# Patient Record
Sex: FEMALE | Race: BLACK OR AFRICAN AMERICAN | NOT HISPANIC OR LATINO | Employment: FULL TIME | URBAN - METROPOLITAN AREA
[De-identification: names, ages, dates, MRNs, and addresses within clinical notes are randomized per-mention and may not be internally consistent; named-entity substitution may affect disease eponyms.]

---

## 2020-07-10 ENCOUNTER — OFFICE VISIT (OUTPATIENT)
Dept: OBSTETRICS AND GYNECOLOGY | Facility: CLINIC | Age: 60
End: 2020-07-10
Payer: COMMERCIAL

## 2020-07-10 VITALS
SYSTOLIC BLOOD PRESSURE: 128 MMHG | WEIGHT: 196.19 LBS | BODY MASS INDEX: 32.69 KG/M2 | HEIGHT: 65 IN | DIASTOLIC BLOOD PRESSURE: 84 MMHG

## 2020-07-10 DIAGNOSIS — Z12.4 SCREENING FOR CERVICAL CANCER: ICD-10-CM

## 2020-07-10 DIAGNOSIS — Z12.31 SCREENING MAMMOGRAM, ENCOUNTER FOR: ICD-10-CM

## 2020-07-10 DIAGNOSIS — Z01.419 ENCOUNTER FOR GYNECOLOGICAL EXAMINATION (GENERAL) (ROUTINE) WITHOUT ABNORMAL FINDINGS: Primary | ICD-10-CM

## 2020-07-10 DIAGNOSIS — N95.2 ATROPHIC VAGINITIS: ICD-10-CM

## 2020-07-10 PROCEDURE — 87624 HPV HI-RISK TYP POOLED RSLT: CPT

## 2020-07-10 PROCEDURE — 99999 PR PBB SHADOW E&M-NEW PATIENT-LVL III: ICD-10-PCS | Mod: PBBFAC,,, | Performed by: OBSTETRICS & GYNECOLOGY

## 2020-07-10 PROCEDURE — 99386 PREV VISIT NEW AGE 40-64: CPT | Mod: S$GLB,,, | Performed by: OBSTETRICS & GYNECOLOGY

## 2020-07-10 PROCEDURE — 99386 PR PREVENTIVE VISIT,NEW,40-64: ICD-10-PCS | Mod: S$GLB,,, | Performed by: OBSTETRICS & GYNECOLOGY

## 2020-07-10 PROCEDURE — 88175 CYTOPATH C/V AUTO FLUID REDO: CPT

## 2020-07-10 PROCEDURE — 99999 PR PBB SHADOW E&M-NEW PATIENT-LVL III: CPT | Mod: PBBFAC,,, | Performed by: OBSTETRICS & GYNECOLOGY

## 2020-07-10 RX ORDER — PAROXETINE HYDROCHLORIDE 20 MG/1
TABLET, FILM COATED ORAL
COMMUNITY
Start: 2020-04-24

## 2020-07-10 RX ORDER — ATENOLOL 50 MG/1
TABLET ORAL
COMMUNITY
Start: 2020-05-21

## 2020-07-10 RX ORDER — LOSARTAN POTASSIUM 25 MG/1
TABLET ORAL
COMMUNITY
Start: 2020-05-21

## 2020-07-17 LAB
HPV HR 12 DNA SPEC QL NAA+PROBE: POSITIVE
HPV16 AG SPEC QL: NEGATIVE
HPV18 DNA SPEC QL NAA+PROBE: NEGATIVE

## 2020-07-20 LAB
FINAL PATHOLOGIC DIAGNOSIS: NORMAL
Lab: NORMAL

## 2020-08-04 ENCOUNTER — HOSPITAL ENCOUNTER (OUTPATIENT)
Dept: RADIOLOGY | Facility: HOSPITAL | Age: 60
Discharge: HOME | End: 2020-08-04
Attending: OBSTETRICS & GYNECOLOGY
Payer: COMMERCIAL

## 2020-08-04 VITALS — BODY MASS INDEX: 32.65 KG/M2 | HEIGHT: 65 IN | WEIGHT: 196 LBS

## 2020-08-04 DIAGNOSIS — Z12.31 SCREENING MAMMOGRAM, ENCOUNTER FOR: ICD-10-CM

## 2020-08-04 PROCEDURE — 77067 SCR MAMMO BI INCL CAD: CPT | Mod: TC

## 2020-08-04 PROCEDURE — 77067 SCR MAMMO BI INCL CAD: CPT | Mod: 26,,, | Performed by: RADIOLOGY

## 2020-08-04 PROCEDURE — 77063 BREAST TOMOSYNTHESIS BI: CPT | Mod: 26,,, | Performed by: RADIOLOGY

## 2020-08-04 PROCEDURE — 77067 MAMMO DIGITAL SCREENING BILAT WITH TOMOSYNTHESIS_CAD: ICD-10-PCS | Mod: 26,,, | Performed by: RADIOLOGY

## 2020-08-04 PROCEDURE — 77063 MAMMO DIGITAL SCREENING BILAT WITH TOMOSYNTHESIS_CAD: ICD-10-PCS | Mod: 26,,, | Performed by: RADIOLOGY

## 2023-07-20 ENCOUNTER — LAB VISIT (OUTPATIENT)
Dept: LAB | Facility: HOSPITAL | Age: 63
End: 2023-07-20
Attending: NURSE PRACTITIONER
Payer: COMMERCIAL

## 2023-07-20 ENCOUNTER — OFFICE VISIT (OUTPATIENT)
Dept: OBSTETRICS AND GYNECOLOGY | Facility: CLINIC | Age: 63
End: 2023-07-20
Payer: COMMERCIAL

## 2023-07-20 DIAGNOSIS — Z01.419 ENCOUNTER FOR GYNECOLOGICAL EXAMINATION WITHOUT ABNORMAL FINDING: Primary | ICD-10-CM

## 2023-07-20 DIAGNOSIS — L30.9 PERIANAL DERMATITIS: ICD-10-CM

## 2023-07-20 DIAGNOSIS — B97.7 HPV IN FEMALE: ICD-10-CM

## 2023-07-20 DIAGNOSIS — K62.9 PERIANAL LESION: ICD-10-CM

## 2023-07-20 PROCEDURE — 99386 PREV VISIT NEW AGE 40-64: CPT | Mod: S$GLB,,, | Performed by: NURSE PRACTITIONER

## 2023-07-20 PROCEDURE — 99386 PR PREVENTIVE VISIT,NEW,40-64: ICD-10-PCS | Mod: S$GLB,,, | Performed by: NURSE PRACTITIONER

## 2023-07-20 PROCEDURE — 1160F RVW MEDS BY RX/DR IN RCRD: CPT | Mod: CPTII,S$GLB,, | Performed by: NURSE PRACTITIONER

## 2023-07-20 PROCEDURE — 86695 HERPES SIMPLEX TYPE 1 TEST: CPT | Performed by: NURSE PRACTITIONER

## 2023-07-20 PROCEDURE — 87529 HSV DNA AMP PROBE: CPT | Performed by: NURSE PRACTITIONER

## 2023-07-20 PROCEDURE — 4010F ACE/ARB THERAPY RXD/TAKEN: CPT | Mod: CPTII,S$GLB,, | Performed by: NURSE PRACTITIONER

## 2023-07-20 PROCEDURE — 1159F MED LIST DOCD IN RCRD: CPT | Mod: CPTII,S$GLB,, | Performed by: NURSE PRACTITIONER

## 2023-07-20 PROCEDURE — 99999 PR PBB SHADOW E&M-EST. PATIENT-LVL II: CPT | Mod: PBBFAC,,, | Performed by: NURSE PRACTITIONER

## 2023-07-20 PROCEDURE — 86694 HERPES SIMPLEX NES ANTBDY: CPT | Performed by: NURSE PRACTITIONER

## 2023-07-20 PROCEDURE — 4010F PR ACE/ARB THEARPY RXD/TAKEN: ICD-10-PCS | Mod: CPTII,S$GLB,, | Performed by: NURSE PRACTITIONER

## 2023-07-20 PROCEDURE — 81514 NFCT DS BV&VAGINITIS DNA ALG: CPT | Performed by: NURSE PRACTITIONER

## 2023-07-20 PROCEDURE — 88175 CYTOPATH C/V AUTO FLUID REDO: CPT | Performed by: NURSE PRACTITIONER

## 2023-07-20 PROCEDURE — 1159F PR MEDICATION LIST DOCUMENTED IN MEDICAL RECORD: ICD-10-PCS | Mod: CPTII,S$GLB,, | Performed by: NURSE PRACTITIONER

## 2023-07-20 PROCEDURE — 87624 HPV HI-RISK TYP POOLED RSLT: CPT | Performed by: NURSE PRACTITIONER

## 2023-07-20 PROCEDURE — 1160F PR REVIEW ALL MEDS BY PRESCRIBER/CLIN PHARMACIST DOCUMENTED: ICD-10-PCS | Mod: CPTII,S$GLB,, | Performed by: NURSE PRACTITIONER

## 2023-07-20 PROCEDURE — 99999 PR PBB SHADOW E&M-EST. PATIENT-LVL II: ICD-10-PCS | Mod: PBBFAC,,, | Performed by: NURSE PRACTITIONER

## 2023-07-20 PROCEDURE — 36415 COLL VENOUS BLD VENIPUNCTURE: CPT | Mod: PO | Performed by: NURSE PRACTITIONER

## 2023-07-20 PROCEDURE — 86592 SYPHILIS TEST NON-TREP QUAL: CPT | Performed by: NURSE PRACTITIONER

## 2023-07-20 RX ORDER — METFORMIN HYDROCHLORIDE 500 MG/5ML
SOLUTION ORAL
COMMUNITY
End: 2023-08-07 | Stop reason: CLARIF

## 2023-07-20 NOTE — PROGRESS NOTES
Subjective:       Patient ID: Opal May is a 62 y.o. female.    Chief Complaint:  Annual Exam      History of Present Illness  HPI  Annual Exam-Postmenopausal   presents for annual exam. The patient has complaints today of itching to upper buttocks; started as a bump; is now open.  Was out of town right before.  Baths with dial.  Additives to water.    The patient is sexually active with fiance; dyspareunia and dryness sometimes.  Vagasil lubricant with relief. GYN screening history: last pap: approximate date 7/10/2020 and was abnormal: NILM, + other types of HPV and last mammogram: approximate date  at [facility] and was normal. Pt declined colpo after last pap.  The patient has never taken hormone replacement therapy. Patient denies post-menopausal vaginal bleeding. The patient wears seatbelts: yes. The patient participates in regular exercise: yes. Has the patient ever been transfused or tattooed?: yes. Tattoo.  The patient reports that there is not domestic violence in her life.    Colonoscopy ; rpt 7 years.  No bladder or bowel issues; mild night sweats -- tolerable.  Sometimes not able to go back to sleep    GYN & OB History  Patient's last menstrual period was 2012 (approximate).   Date of Last Pap: 2023    OB History    Para Term  AB Living   2 2 2     2   SAB IAB Ectopic Multiple Live Births           2      # Outcome Date GA Lbr Karlos/2nd Weight Sex Delivery Anes PTL Lv   2 Term 87           1 Term 86               Review of Systems  Review of Systems   Constitutional:  Negative for activity change, appetite change, chills, fatigue and fever.        +night sweats   HENT:  Negative for nasal congestion and mouth sores.    Respiratory:  Negative for cough, shortness of breath and wheezing.    Cardiovascular:  Negative for chest pain.   Gastrointestinal:  Negative for abdominal pain, constipation, diarrhea, nausea and vomiting.    Endocrine: Negative for hair loss and hot flashes.   Genitourinary:  Positive for dyspareunia, genital sores and vaginal dryness. Negative for bladder incontinence, decreased libido, dysuria, frequency, hot flashes, pelvic pain, urgency, vaginal discharge, vaginal pain, urinary incontinence, postcoital bleeding, postmenopausal bleeding and vaginal odor.        +perianal itching   Musculoskeletal:  Negative for back pain.   Integumentary:  Negative for breast mass, nipple discharge, breast skin changes and breast tenderness.   Neurological:  Negative for headaches.   Hematological:  Negative for adenopathy.   Psychiatric/Behavioral:  Negative for depression. The patient is not nervous/anxious.    All other systems reviewed and are negative.  Breast: Negative for breast self exam, lump, mass, mastodynia, nipple discharge, skin changes and tenderness        Objective:      Physical Exam:   Constitutional: She is oriented to person, place, and time. She appears well-developed and well-nourished. No distress.    HENT:   Head: Normocephalic and atraumatic.   Nose: Nose normal.    Eyes: Pupils are equal, round, and reactive to light. Conjunctivae and EOM are normal. Right eye exhibits no discharge. Left eye exhibits no discharge.     Cardiovascular:  Normal rate, regular rhythm and normal heart sounds.      Exam reveals no gallop, no friction rub, no clubbing, no cyanosis and no edema.       No murmur heard.   Pulmonary/Chest: Effort normal and breath sounds normal. No respiratory distress. She has no decreased breath sounds. She has no wheezes. She has no rhonchi. She has no rales. She exhibits no tenderness. Right breast exhibits augmentation. Right breast exhibits no inverted nipple, no mass, no nipple discharge, no skin change, no tenderness, no bleeding and no swelling. Left breast exhibits augmentation. Left breast exhibits no inverted nipple, no mass, no nipple discharge, no skin change, no tenderness, no bleeding  and no swelling. Breasts are symmetrical.        Abdominal: Soft. Bowel sounds are normal. She exhibits no distension. There is no abdominal tenderness. There is no rebound and no guarding. Hernia confirmed negative in the right inguinal area and confirmed negative in the left inguinal area.     Genitourinary:    Inguinal canal, uterus, right adnexa and left adnexa normal.   Rectum:      External hemorrhoid present.            Pelvic exam was performed with patient in the lithotomy position.   The external female genitalia was normal.   No external genitalia lesions identified,Genitalia hair distrobution normal .   Labial bartholins normal.There is no rash, tenderness, lesion or injury on the right labia. There is no rash, tenderness, lesion or injury on the left labia. Cervix is normal. Right adnexum displays no mass, no tenderness and no fullness. Left adnexum displays no mass, no tenderness and no fullness. There is vaginal discharge (white, creamy; no odor) in the vagina. No erythema, tenderness or bleeding in the vagina.    No foreign body in the vagina.      No signs of injury in the vagina.   Vagina was moist.Vaginal atrophy noted. Cervix exhibits no motion tenderness, no lesion, no discharge, no friability, no lesion, no tenderness and no polyp.    pap smear completedUerus contour normal  Uterus is not enlarged and not tender. Uterus size: 12 cm.Normal urethral meatus.Urethral Meatus exhibits: urethral lesion and prolapsedUrethra findings: no urethral mass, no tenderness and no urethral scarringBladder findings: no bladder distention and no bladder tenderness          Musculoskeletal: Normal range of motion and moves all extremeties.      Lymphadenopathy: No inguinal adenopathy noted on the right or left side.    Neurological: She is alert and oriented to person, place, and time.    Skin: Skin is warm and dry. No rash noted. She is not diaphoretic. No cyanosis or erythema. No pallor. Nails show no clubbing.     Psychiatric: She has a normal mood and affect. Her speech is normal and behavior is normal. Judgment and thought content normal.           Assessment:        1. Encounter for gynecological examination without abnormal finding    2. Perianal dermatitis    3. HPV in female    4. Perianal lesion               Plan:   Labs and cx today    The pt was extensively counseled on Genital HSV.  In particular, it is an incurable and recurrent disease that is disruptive but benign.  Symptoms are treatable with medications, but theses medications do not cure the disease. Viral transmission may occur during asymptomatic and symptomatic phases, although transmission during symptomatic phases is more likely.  Thus, avoidance of intercourse during symptomatic phases is recommended. The use of a condoms or other barrier protectors may not prevent transmission to other partners. It is recommended that pt notify any future partners of HSV status prior to initiating any sexual intercourse activities.  The patient indicates understanding of these issues.     Discussed oil based lubricants for dryness; RTC if not effective.    Reviewed options for hot flushes, including lifestyle measures such as avoiding caffeine, dressing in layers, avoiding hot foods/drinks, soy milk before bed, sleeping with a fan--reviewed use otc--amberen, estroven, rocio anita, black cohosh, red clover, non estrogen-- pt already taking paxil    Reviewed updated recommendations for pap smears (every 3 years) in low risk patients.  Recommend annual pelvic exams.  Reviewed recommendations for annual CBE.  Pap collected today.  Next pap due in 2026.   RTC 1 year or sooner prn.  To PCP for other health maintenance.      Encounter for gynecological examination without abnormal finding  -     Liquid-Based Pap Smear, Screening  -     HPV High Risk Genotypes, PCR    Perianal dermatitis  -     Vaginosis Screen by DNA Probe    HPV in female  -     Liquid-Based Pap Smear,  Screening  -     HPV High Risk Genotypes, PCR    Perianal lesion  -     HSV by Rapid PCR, Non-Blood [facility]; Skin (perianal)  -     HSV 1 & 2, IgG; Future; Expected date: 07/20/2023  -     HSV 1 & 2, IgM; Future; Expected date: 07/20/2023  -     RPR; Future; Expected date: 07/20/2023

## 2023-07-21 DIAGNOSIS — B37.31 YEAST VAGINITIS: Primary | ICD-10-CM

## 2023-07-21 DIAGNOSIS — A60.1 HERPES SIMPLEX INFECTION OF PERIANAL SKIN: Primary | ICD-10-CM

## 2023-07-21 LAB
BACTERIAL VAGINOSIS DNA: NEGATIVE
CANDIDA GLABRATA DNA: NEGATIVE
CANDIDA KRUSEI DNA: NEGATIVE
CANDIDA RRNA VAG QL PROBE: POSITIVE
HSV1 IGG SERPL QL IA: POSITIVE
HSV2 IGG SERPL QL IA: POSITIVE
RPR SER QL: NORMAL
T VAGINALIS RRNA GENITAL QL PROBE: NEGATIVE

## 2023-07-21 RX ORDER — FLUCONAZOLE 150 MG/1
150 TABLET ORAL
Qty: 2 TABLET | Refills: 0 | Status: SHIPPED | OUTPATIENT
Start: 2023-07-21 | End: 2023-07-25

## 2023-07-21 RX ORDER — VALACYCLOVIR HYDROCHLORIDE 1 G/1
1000 TABLET, FILM COATED ORAL 2 TIMES DAILY
Qty: 20 TABLET | Refills: 0 | Status: SHIPPED | OUTPATIENT
Start: 2023-07-21 | End: 2023-08-07 | Stop reason: DRUGHIGH

## 2023-07-21 NOTE — PROGRESS NOTES
Attempted to contact pt no answer lvm for pt to give clinic a callback Monday morning at [phone].

## 2023-07-21 NOTE — PROGRESS NOTES
Attempted to contact pt no answer lvm for pt to give clinic a callback Monday morning at [phone]

## 2023-07-24 LAB — HSV AB, IGM BY EIA: 0.85 INDEX

## 2023-07-24 NOTE — PROGRESS NOTES
Called pt confirmed pt identifiers pt stated she viewed results and picked up medication. Asked pt if she understood results pt stated yes asked if she had any questions pt stated she did not. Verbalized understanding

## 2023-07-24 NOTE — PROGRESS NOTES
Called pt confirmed pt identifiers pt stated she viewed results and picked up medication. Asked pt if she understood results pt stated yes asked if she had any questions pt stated she did not. Verbalized understanding.

## 2023-07-25 LAB
HSV1 DNA SPEC QL NAA+PROBE: NEGATIVE
HSV2 DNA SPEC QL NAA+PROBE: POSITIVE
SPECIMEN SOURCE: ABNORMAL

## 2023-07-26 PROBLEM — R87.619 ATYPICAL GLANDULAR CELLS OF UNDETERMINED SIGNIFICANCE (AGUS) ON CERVICAL PAP SMEAR: Status: ACTIVE | Noted: 2023-07-26

## 2023-07-26 LAB
CLINICAL INFO: ABNORMAL
CYTO CVX: ABNORMAL
CYTOLOGIST CVX/VAG CYTO: ABNORMAL
CYTOLOGIST CVX/VAG CYTO: ABNORMAL
CYTOLOGY CMNT CVX/VAG CYTO-IMP: ABNORMAL
CYTOLOGY PAP THIN PREP EXPLANATION: ABNORMAL
DATE OF PREVIOUS PAP: ABNORMAL
DATE PREVIOUS BX: NO
GEN CATEG CVX/VAG CYTO-IMP: ABNORMAL
HPV I/H RISK 4 DNA CVX QL NAA+PROBE: NOT DETECTED
LMP START DATE: ABNORMAL
MICROORGANISM CVX/VAG CYTO: ABNORMAL
PATHOLOGIST CVX/VAG CYTO: ABNORMAL
SERVICE CMNT-IMP: ABNORMAL
SPECIMEN SOURCE CVX/VAG CYTO: ABNORMAL
STAT OF ADQ CVX/VAG CYTO-IMP: ABNORMAL

## 2023-07-26 NOTE — PROGRESS NOTES
Called pt confirmed pt identifiers informed pt of pap smear results and that she will need colposcopy and endometrial biopsy with  and VV with LW to discuss both procedures, pt verbalized understanding scheduled pt for VV with LW 8/7 and procedures with  for 8/25, spoke to pt 7/24 regarding HSV and RX, pt verbalized understanding and had no additional questions

## 2023-08-07 ENCOUNTER — OFFICE VISIT (OUTPATIENT)
Dept: OBSTETRICS AND GYNECOLOGY | Facility: CLINIC | Age: 63
End: 2023-08-07
Payer: COMMERCIAL

## 2023-08-07 ENCOUNTER — TELEPHONE (OUTPATIENT)
Dept: OBSTETRICS AND GYNECOLOGY | Facility: CLINIC | Age: 63
End: 2023-08-07
Payer: COMMERCIAL

## 2023-08-07 DIAGNOSIS — R87.619 ATYPICAL GLANDULAR CELLS OF UNDETERMINED SIGNIFICANCE (AGUS) ON CERVICAL PAP SMEAR: Primary | ICD-10-CM

## 2023-08-07 DIAGNOSIS — A60.1 HERPES SIMPLEX INFECTION OF PERIANAL SKIN: ICD-10-CM

## 2023-08-07 PROCEDURE — 1159F PR MEDICATION LIST DOCUMENTED IN MEDICAL RECORD: ICD-10-PCS | Mod: CPTII,95,, | Performed by: NURSE PRACTITIONER

## 2023-08-07 PROCEDURE — 99213 OFFICE O/P EST LOW 20 MIN: CPT | Mod: 95,,, | Performed by: NURSE PRACTITIONER

## 2023-08-07 PROCEDURE — 1160F PR REVIEW ALL MEDS BY PRESCRIBER/CLIN PHARMACIST DOCUMENTED: ICD-10-PCS | Mod: CPTII,95,, | Performed by: NURSE PRACTITIONER

## 2023-08-07 PROCEDURE — 1160F RVW MEDS BY RX/DR IN RCRD: CPT | Mod: CPTII,95,, | Performed by: NURSE PRACTITIONER

## 2023-08-07 PROCEDURE — 1159F MED LIST DOCD IN RCRD: CPT | Mod: CPTII,95,, | Performed by: NURSE PRACTITIONER

## 2023-08-07 PROCEDURE — 4010F ACE/ARB THERAPY RXD/TAKEN: CPT | Mod: CPTII,95,, | Performed by: NURSE PRACTITIONER

## 2023-08-07 PROCEDURE — 4010F PR ACE/ARB THEARPY RXD/TAKEN: ICD-10-PCS | Mod: CPTII,95,, | Performed by: NURSE PRACTITIONER

## 2023-08-07 PROCEDURE — 99213 PR OFFICE/OUTPT VISIT, EST, LEVL III, 20-29 MIN: ICD-10-PCS | Mod: 95,,, | Performed by: NURSE PRACTITIONER

## 2023-08-07 RX ORDER — METFORMIN HYDROCHLORIDE 500 MG/1
500 TABLET ORAL 2 TIMES DAILY
COMMUNITY
Start: 2023-07-28

## 2023-08-07 RX ORDER — VALACYCLOVIR HYDROCHLORIDE 500 MG/1
500 TABLET, FILM COATED ORAL 2 TIMES DAILY
Qty: 6 TABLET | Refills: 8 | Status: SHIPPED | OUTPATIENT
Start: 2023-08-07

## 2023-08-07 NOTE — TELEPHONE ENCOUNTER
Attempted to contact patient to room for vv today with LW. Pt did not answer. LVM for pt to rtc to clinic.

## 2023-08-07 NOTE — PROGRESS NOTES
Subjective:       Patient ID: Opal May is a 62 y.o. female.    Chief Complaint:  Abnormal Pap Smear      History of Present Illness  HPI  The patient location is: [place], [place]  The chief complaint leading to consultation is: abnormal pap    Visit type: audiovisual    Face to Face time with patient: 15 minutes of total time spent on the encounter, which includes face to face time and non-face to face time preparing to see the patient (eg, review of tests), Obtaining and/or reviewing separately obtained history, Documenting clinical information in the electronic or other health record, Independently interpreting results (not separately reported) and communicating results to the patient/family/caregiver, or Care coordination (not separately reported).         Each patient to whom he or she provides medical services by telemedicine is:  (1) informed of the relationship between the physician and patient and the respective role of any other health care provider with respect to management of the patient; and (2) notified that he or she may decline to receive medical services by telemedicine and may withdraw from such care at any time.    Notes:    present for abnormal pap  GISSELLE, neg HPV    GYN & OB History  Patient's last menstrual period was 2012 (approximate).   Date of Last Pap: 2020    OB History    Para Term  AB Living   2 2 2     2   SAB IAB Ectopic Multiple Live Births           2      # Outcome Date GA Lbr Karlos/2nd Weight Sex Delivery Anes PTL Lv   2 Term 87           1 Term 86               Review of Systems  Review of Systems   Genitourinary:  Negative for genital sores.           Objective:      Physical Exam:   Constitutional: She is oriented to person, place, and time. She appears well-developed and well-nourished. No distress.    HENT:   Head: Normocephalic and atraumatic.    Eyes: Pupils are equal, round, and reactive to light. Conjunctivae and EOM are normal.       Pulmonary/Chest: Effort normal.                  Musculoskeletal: Normal range of motion.       Neurological: She is alert and oriented to person, place, and time.    Skin: She is not diaphoretic.    Psychiatric: She has a normal mood and affect. Her behavior is normal. Judgment and thought content normal.           Assessment:        1. Atypical glandular cells of undetermined significance (GISSELLE) on cervical Pap smear    2. Herpes simplex infection of perianal skin               Plan:   Discussed HSV; pt would like episodic therapy  Rx sent for valtrex and instructed on use with refills.    Discussed pap results with pt in detail  Discussed colpo and EMB procedure  Instructed to take NSAID when on the way    Continue annual well woman exam.    Atypical glandular cells of undetermined significance (GISSELLE) on cervical Pap smear    Herpes simplex infection of perianal skin  -     valACYclovir (VALTREX) 500 MG tablet; Take 1 tablet (500 mg total) by mouth 2 (two) times daily.  Dispense: 6 tablet; Refill: 8

## 2023-08-25 ENCOUNTER — PROCEDURE VISIT (OUTPATIENT)
Dept: OBSTETRICS AND GYNECOLOGY | Facility: CLINIC | Age: 63
End: 2023-08-25
Payer: COMMERCIAL

## 2023-08-25 VITALS
BODY MASS INDEX: 33.21 KG/M2 | SYSTOLIC BLOOD PRESSURE: 130 MMHG | DIASTOLIC BLOOD PRESSURE: 80 MMHG | HEIGHT: 65 IN | WEIGHT: 199.31 LBS

## 2023-08-25 DIAGNOSIS — R87.619 ATYPICAL GLANDULAR CELLS OF UNDETERMINED SIGNIFICANCE (AGUS) ON CERVICAL PAP SMEAR: Primary | ICD-10-CM

## 2023-08-25 PROCEDURE — 88305 TISSUE EXAM BY PATHOLOGIST: ICD-10-PCS | Mod: 26,,, | Performed by: PATHOLOGY

## 2023-08-25 PROCEDURE — 88305 TISSUE EXAM BY PATHOLOGIST: CPT | Performed by: PATHOLOGY

## 2023-08-25 PROCEDURE — 57456 COLPOSCOPY: ICD-10-PCS | Mod: S$GLB,,, | Performed by: OBSTETRICS & GYNECOLOGY

## 2023-08-25 PROCEDURE — 57456 ENDOCERV CURETTAGE W/SCOPE: CPT | Mod: S$GLB,,, | Performed by: OBSTETRICS & GYNECOLOGY

## 2023-08-25 PROCEDURE — 88305 TISSUE EXAM BY PATHOLOGIST: CPT | Mod: 26,,, | Performed by: PATHOLOGY

## 2023-08-25 NOTE — PROCEDURES
Procedures    CC: ENDOMETRIAL BIOPSPY    Opal May is a 62 y.o. female  presents for an endometrial biopsy secondary to pap showing gisselle.      PRE-ENDOMETRIAL BIOPSY COUNSELING:    The patient was informed of the risk of bleeding, infection, uterine perforation and pain and that the test will rule-out endometrial cancer with accuracy greater than 95%.  She was counseled on the alternatives to endometrial biopsy and agrees to proceed.      TIME OUT PERFORMED.    The cervix was visualized with a speculum.  A single tooth tenaculum was placed on the anterior lip prior to the biopsy.      A sterile endometrial pipelle was passed without difficulty to a depth of 8 cm.    Endometrial tissue was obtained.      The specimen was placed in formalyn and sent to Pathology of histology evaluation.    The patient tolerated the procedure well.      ASSESSMENT AND PLAN  1. Atypical glandular cells of undetermined significance (GISSELLE) on cervical Pap smear  Specimen to Pathology, Ob/Gyn          POST ENDOMETRIAL BIOPSY COUNSELING:  Manage post biopsy cramping with NSAIDs or Tylenol.  Expect spotting or light bleeding for a few days.  Report bleeding heavier than a period, fever > 101.0 F, worsening pain or a foul smelling vaginal discharge.      Counseling lasted approximately 15 minutes and all her questions were answered.      FOLLOW-UP:  Pending biopsy

## 2023-08-25 NOTE — PROCEDURES
Colposcopy    Date/Time: 8/25/2023 1:30 PM    Performed by: Ashley Carrington MD  Authorized by: Ashley Carrington MD    Consent Done?:  Yes (Written)  Timeout:Immediately prior to procedure a time out was called to verify the correct patient, procedure, equipment, support staff and site/side marked as required  Prep:Patient was prepped and draped in the usual sterile fashion    Colposcopy Site:  Cervix  Position:  Supine   Patient was prepped and draped in the normal sterile fashion.  Acrowhite Lesion: No    Atypical Vessels: No    Transformation Zone Adequate?: No    Biopsy?: No    ECC Performed?: Yes    LEEP Performed?: No     Patient tolerated the procedure well with no immediate complications.   Post-operative instructions were provided for the patient.   Patient was discharged and will follow up if any complications occur

## 2023-08-29 LAB
FINAL PATHOLOGIC DIAGNOSIS: NORMAL
GROSS: NORMAL
Lab: NORMAL

## 2024-09-24 DIAGNOSIS — A60.1 HERPES SIMPLEX INFECTION OF PERIANAL SKIN: ICD-10-CM

## 2024-09-24 RX ORDER — VALACYCLOVIR HYDROCHLORIDE 500 MG/1
500 TABLET, FILM COATED ORAL 2 TIMES DAILY
Qty: 6 TABLET | Refills: 0 | Status: SHIPPED | OUTPATIENT
Start: 2024-09-24

## 2024-09-25 NOTE — TELEPHONE ENCOUNTER
Called pt regarding refill request for valtrex confirmed pt identifiers informed one refill was sent to pharmacy on file due for annual pt stated she was at work and will call back to schedule once she can look at her schedule informed if better for her she can schedule on the portal, pt voiced understanding of this.

## 2024-11-19 ENCOUNTER — PATIENT MESSAGE (OUTPATIENT)
Dept: NEUROLOGY | Facility: CLINIC | Age: 64
End: 2024-11-19
Payer: COMMERCIAL